# Patient Record
Sex: FEMALE | Race: WHITE | NOT HISPANIC OR LATINO
[De-identification: names, ages, dates, MRNs, and addresses within clinical notes are randomized per-mention and may not be internally consistent; named-entity substitution may affect disease eponyms.]

---

## 2020-10-23 ENCOUNTER — APPOINTMENT (OUTPATIENT)
Dept: OBGYN | Facility: CLINIC | Age: 45
End: 2020-10-23

## 2022-08-29 ENCOUNTER — NON-APPOINTMENT (OUTPATIENT)
Age: 47
End: 2022-08-29

## 2022-08-29 ENCOUNTER — APPOINTMENT (OUTPATIENT)
Dept: OBGYN | Facility: CLINIC | Age: 47
End: 2022-08-29

## 2022-08-29 VITALS
WEIGHT: 200 LBS | HEIGHT: 62 IN | BODY MASS INDEX: 36.8 KG/M2 | SYSTOLIC BLOOD PRESSURE: 140 MMHG | DIASTOLIC BLOOD PRESSURE: 90 MMHG

## 2022-08-29 DIAGNOSIS — N63.10 UNSPECIFIED LUMP IN THE RIGHT BREAST, UNSPECIFIED QUADRANT: ICD-10-CM

## 2022-08-29 DIAGNOSIS — Z01.419 ENCOUNTER FOR GYNECOLOGICAL EXAMINATION (GENERAL) (ROUTINE) W/OUT ABNORMAL FINDINGS: ICD-10-CM

## 2022-08-29 PROCEDURE — 99386 PREV VISIT NEW AGE 40-64: CPT

## 2022-08-29 NOTE — PHYSICAL EXAM
[Chaperone Declined] : Patient declined chaperone [Appropriately responsive] : appropriately responsive [Alert] : alert [No Acute Distress] : no acute distress [No Lymphadenopathy] : no lymphadenopathy [Soft] : soft [Non-tender] : non-tender [Non-distended] : non-distended [No HSM] : No HSM [No Lesions] : no lesions [No Mass] : no mass [Oriented x3] : oriented x3 [Examination Of The Breasts] : a normal appearance [Labia Majora] : normal [Labia Minora] : normal [Normal] : normal [Uterine Adnexae] : normal [___cm] : a ~M [unfilled] ~Ucm superior medial quadrant mass was palpated [Breast Mass Left Breast ___cm] : no mass was palpable

## 2022-08-29 NOTE — HISTORY OF PRESENT ILLNESS
[FreeTextEntry1] : 46 yo,G0, previous pt here, annual exam due. She has a h/o Bartholin glad abscess. H/o IDDM. She has not had a pap in many years or mammo. She offers no gyn complaints. PEriods are regualr, occ. will skip but rare. Periods are not heavy.\par \par She is rarely sexually active.\par \par She denies family h/o gyn or colon cancers.

## 2022-08-29 NOTE — PLAN
[FreeTextEntry1] : Well woman exam\par pap done\par mammo ordered\par rt in 1 year\par possible right breast lump-dx mammo and sono ordered\par

## 2022-09-06 LAB — CYTOLOGY CVX/VAG DOC THIN PREP: NORMAL

## 2023-10-17 ENCOUNTER — APPOINTMENT (OUTPATIENT)
Dept: OBGYN | Facility: CLINIC | Age: 48
End: 2023-10-17

## 2024-01-29 ENCOUNTER — APPOINTMENT (OUTPATIENT)
Dept: ORTHOPEDIC SURGERY | Facility: CLINIC | Age: 49
End: 2024-01-29
Payer: MEDICARE

## 2024-01-29 PROCEDURE — 25600 CLTX DST RDL FX/EPHYS SEP WO: CPT | Mod: LT

## 2024-01-29 PROCEDURE — 99204 OFFICE O/P NEW MOD 45 MIN: CPT | Mod: 57

## 2024-01-29 PROCEDURE — 73130 X-RAY EXAM OF HAND: CPT | Mod: LT

## 2024-01-29 NOTE — ASSESSMENT
[FreeTextEntry1] : The condition was explained to the patient. Fracture alignment within acceptable limits. Plan to proceed with non-surgical treatment.  Risks include, but are not limited to persistent pain, stiffness, post-traumatic arthritis, fracture displacement, need for future surgery, mal-union, non-union. Risk of acute carpal tunnel syndrome, which may require surgery. Risk of delayed tendon rupture, which may require surgery. All patient questions were answered. Patient expressed understanding and would like to proceed with non-surgical treatment.  - prescribed OT custom volar wrist splint, full time except hygiene. applied orthoglass (pre-padded fiberglass) short arm splint in the interim. reviewed splint care instructions. demonstrated HEP for digital flexion/extension. emphasized the importance of performing ROM exercises to reduce stiffness, as finger stiffness can cause grave dysfunction and is difficult to overcome once it has set in. - elevate wrist above level of heart as much as possible to reduce swelling. - NWB to L hand. - recommend ice and OTC pain medications such as Tylenol and NSAIDs as needed. reviewed contra-indicated medical conditions (eg liver disease, kidney disease, or GI ulcer/bleeding) or medications (eg blood thinners). Discussed possible GI and blood pressure side effects. - recommend f/u with PCP regarding osteoporosis evaluation and treatment. will start OTC Calcium + vitamin D supplementation in the interim.  F/u 1wk. X-rays L wrist out of custom splint.

## 2024-01-29 NOTE — HISTORY OF PRESENT ILLNESS
[7] : 7 [de-identified] : 1/29/24: 49yo RHD female ( for 4yo child) presents with uncle for LEFT wrist pain after FOOSH on 1/24/24. She was kneeling on the ground and braced her hand against a shelf to get up, but the shelf split, and she fell onto that hand. Also reports intermittent numbness of hand. Went to [facility] on 1/25/24 => XR, splint.  Hx: DM c/b retinopathy (legally blind), nephropathy, neuropathy - on insulin pump. [] : no [FreeTextEntry1] : left wrist  [FreeTextEntry5] : GUILLERMO is a [ RHD ] F here today for left wrist pain. after falling on hand 01/24/24. swelling in fingertip. bruised up the forearm. went to [facility] 01/25/24, stated she had a fracture. Was put into a splint.  [de-identified] : 01/25/24 [de-identified] : [facility]  [de-identified] : xray

## 2024-01-29 NOTE — IMAGING
[de-identified] : LEFT HAND skin intact. mild swelling of wrist. bruising over volar wrist. TTP to distal radius. TTP to IF/MF. non-tender to elbow. good EPL, FPL. good finger extension, flex to full fist. good finger abduction and adduction.  SILT to median, ulnar, radial distribution.  palpable radial pulse, brisk cap refill all digits. no triggering.   XRAYS OF LEFT WRIST @[facility] 1/25/24: minimally displaced distal radius extra-articular fx. chondrocalcinosis of ulnocarpal and lunotriquetral joints. vascular calcifications. XRYS OF LEFT HAND TODAY: stable position/alignment of minimally displaced distal radius extra-articular fx. chondrocalcinosis of ulnocarpal and lunotriquetral joints. vascular calcifications.

## 2024-02-08 ENCOUNTER — APPOINTMENT (OUTPATIENT)
Dept: ORTHOPEDIC SURGERY | Facility: CLINIC | Age: 49
End: 2024-02-08
Payer: MEDICARE

## 2024-02-08 PROCEDURE — 99024 POSTOP FOLLOW-UP VISIT: CPT

## 2024-02-08 PROCEDURE — 73110 X-RAY EXAM OF WRIST: CPT | Mod: LT

## 2024-02-08 NOTE — IMAGING
[de-identified] : LEFT HAND skin intact. mild swelling of wrist. good EPL, FPL. good finger extension, IF flex to mid-palm, other fingers flex to loose fist. good finger abduction and adduction.  SILT to median, ulnar, radial distribution.  palpable radial pulse, brisk cap refill all digits. no triggering.   XRAYS OF LEFT WRIST: stable position/alignment with interval healing of distal radius extra-articular fx. chondrocalcinosis of ulnocarpal and lunotriquetral joints. vascular calcifications.

## 2024-02-08 NOTE — ASSESSMENT
[FreeTextEntry1] : - maintain OT custom volar wrist splint, full time except hygiene. continue HEP for digital ROM. - elevate wrist above level of heart as much as possible to reduce swelling. - NWB to L hand.  F/u 2wks. X-rays L wrist out of custom splint.

## 2024-02-08 NOTE — HISTORY OF PRESENT ILLNESS
[de-identified] : 2/8/24: 2 weeks s/p LEFT distal radius extra-articular fx from 1/24/24. in OT custom wrist spint, full time except hygiene. pain improving. denies numbness/tingling.  1/29/24: 47yo RHD female ( for 4yo child) presents with uncle for LEFT wrist pain after FOOSH on 1/24/24. She was kneeling on the ground and braced her hand against a shelf to get up, but the shelf split, and she fell onto that hand. Also reports intermittent numbness of hand. Went to [facility] on 1/25/24 => XR, splint.  Hx: DM c/b retinopathy (legally blind), nephropathy, neuropathy - on insulin pump. [FreeTextEntry1] : LEFT wrist  [FreeTextEntry5] : GUILLERMO is here today to follow up on her LEFT wrist fx. pt states since last visit, pain and swelling decreased. pt did not start vitamin D and calcium supplememts.

## 2024-02-23 ENCOUNTER — APPOINTMENT (OUTPATIENT)
Dept: ORTHOPEDIC SURGERY | Facility: CLINIC | Age: 49
End: 2024-02-23
Payer: MEDICARE

## 2024-02-23 PROCEDURE — 99024 POSTOP FOLLOW-UP VISIT: CPT

## 2024-02-23 PROCEDURE — 73110 X-RAY EXAM OF WRIST: CPT | Mod: LT

## 2024-02-23 NOTE — IMAGING
[de-identified] : LEFT HAND skin intact. mild swelling of wrist. good EPL, FPL. good finger extension, IF flex to mid-palm, other fingers flex to loose fist. good finger abduction and adduction.  SILT to median, ulnar, radial distribution.  palpable radial pulse, brisk cap refill all digits. no triggering.   XRAYS OF LEFT WRIST: stable position/alignment with interval healing of distal radius extra-articular fx. chondrocalcinosis of ulnocarpal and lunotriquetral joints. vascular calcifications.

## 2024-02-23 NOTE — HISTORY OF PRESENT ILLNESS
[de-identified] : 2/23/24: 4 weeks s/p LEFT distal radius extra-articular fx from 1/24/24. in OT custom wrist spint, full time except hygiene. pain better.  2/8/24: 2 weeks s/p LEFT distal radius extra-articular fx from 1/24/24. in OT custom wrist spint, full time except hygiene. pain improving. denies numbness/tingling.  1/29/24: 47yo RHD female ( for 4yo child) presents with uncle for LEFT wrist pain after FOOSH on 1/24/24. She was kneeling on the ground and braced her hand against a shelf to get up, but the shelf split, and she fell onto that hand. Also reports intermittent numbness of hand. Went to [facility] on 1/25/24 => XR, splint.  Hx: DM c/b retinopathy (legally blind), nephropathy, neuropathy - on insulin pump. [FreeTextEntry1] : LEFT wrist  [FreeTextEntry5] : GUILLERMO is here today to follow up on her LEFT wrist fx. pt states since last visit pain has decreased.

## 2024-03-11 ENCOUNTER — APPOINTMENT (OUTPATIENT)
Dept: ORTHOPEDIC SURGERY | Facility: CLINIC | Age: 49
End: 2024-03-11
Payer: MEDICARE

## 2024-03-11 PROCEDURE — 73110 X-RAY EXAM OF WRIST: CPT | Mod: LT

## 2024-03-11 PROCEDURE — 99024 POSTOP FOLLOW-UP VISIT: CPT

## 2024-03-11 NOTE — ASSESSMENT
[FreeTextEntry1] : - wear OT custom volar wrist splint for at-risk activity and sleep x 2 weeks, then d/c daytime use but ok to continue for sleeping. - prescribed OT for L wrist. WBAT. - gradually advance activity as pain allows.  F/u 6wks. X-rays L wrist.

## 2024-03-11 NOTE — HISTORY OF PRESENT ILLNESS
[de-identified] : 3/11/24: 6.5 weeks s/p LEFT distal radius extra-articular fx from 1/24/24. in OT custom wrist splint, full time except hygiene.  2/23/24: 4 weeks s/p LEFT distal radius extra-articular fx from 1/24/24. in OT custom wrist splint, full time except hygiene. pain better.  2/8/24: 2 weeks s/p LEFT distal radius extra-articular fx from 1/24/24. in OT custom wrist spint, full time except hygiene. pain improving. denies numbness/tingling.  1/29/24: 49yo RHD female ( for 6yo child) presents with uncle for LEFT wrist pain after FOOSH on 1/24/24. She was kneeling on the ground and braced her hand against a shelf to get up, but the shelf split, and she fell onto that hand. Also reports intermittent numbness of hand. Went to [facility] on 1/25/24 => XR, splint.  Hx: DM c/b retinopathy (legally blind), nephropathy, neuropathy - on insulin pump. [FreeTextEntry1] : LEFT wrist  [FreeTextEntry5] : GUILLERMO is here today to follow up on her LEFT wrist fx. pt states since last visit, pain decreased.

## 2024-03-11 NOTE — IMAGING
[de-identified] : LEFT HAND skin intact. mild swelling of wrist. TTP to distal radius. wrist ROM: very limited extension, flexion. good EPL, FPL. good finger extension, fingers flex to loose fist. good finger abduction and adduction.  SILT to median, ulnar, radial distribution.  palpable radial pulse, brisk cap refill all digits. no triggering.   XRAYS OF LEFT WRIST: stable position/alignment with interval healing of distal radius extra-articular fx. chondrocalcinosis of ulnocarpal and lunotriquetral joints. vascular calcifications.

## 2024-05-01 ENCOUNTER — APPOINTMENT (OUTPATIENT)
Dept: ORTHOPEDIC SURGERY | Facility: CLINIC | Age: 49
End: 2024-05-01
Payer: MEDICARE

## 2024-05-01 PROCEDURE — 99213 OFFICE O/P EST LOW 20 MIN: CPT

## 2024-05-01 PROCEDURE — 73110 X-RAY EXAM OF WRIST: CPT | Mod: LT

## 2024-05-01 NOTE — ASSESSMENT
[FreeTextEntry1] : - d/c custom wrist splint. - continue OT for L wrist. WBAT. - gradually advance activity as pain allows.  F/u 6wks.

## 2024-05-01 NOTE — IMAGING
[de-identified] : LEFT HAND skin intact. mild swelling of wrist. TTP to distal radius. wrist ROM: limited extension, flexion. good EPL, FPL. good finger extension, fingers flex to full fist. good finger abduction and adduction.  SILT to median, ulnar, radial distribution.  palpable radial pulse, brisk cap refill all digits. no triggering.   XRAYS OF LEFT WRIST: stable position/alignment with interval healing of distal radius extra-articular fx. chondrocalcinosis of ulnocarpal and lunotriquetral joints. vascular calcifications.

## 2024-05-01 NOTE — HISTORY OF PRESENT ILLNESS
[de-identified] : 5/1/24: 3 months s/p LEFT distal radius extra-articular fx from 1/24/24. in custom wrist splint. Attended OT evaluation on 4/30/24. OT delayed due to the flu.  3/11/24: 6.5 weeks s/p LEFT distal radius extra-articular fx from 1/24/24. in OT custom wrist splint, full time except hygiene.  2/23/24: 4 weeks s/p LEFT distal radius extra-articular fx from 1/24/24. in OT custom wrist splint, full time except hygiene. pain better.  2/8/24: 2 weeks s/p LEFT distal radius extra-articular fx from 1/24/24. in OT custom wrist spint, full time except hygiene. pain improving. denies numbness/tingling.  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 She was kneeling on the ground and braced her hand against a shelf to get up, but the shelf split, and she fell onto that hand. Also reports intermittent numbness of hand. Went to [facility] on 1/25/24 => XR, splint.  Hx: DM c/b retinopathy (legally blind), nephropathy, neuropathy - on insulin pump. [FreeTextEntry1] : LEFT wrist  [FreeTextEntry5] : GUILLERMO is here today to follow up on her LEFT wrist fx. pt states since last visit, pain decreased. wearing custom brace. started therapy yesterday (04/30/24) [de-identified] : therapy

## 2024-06-12 ENCOUNTER — APPOINTMENT (OUTPATIENT)
Dept: ORTHOPEDIC SURGERY | Facility: CLINIC | Age: 49
End: 2024-06-12
Payer: MEDICARE

## 2024-06-12 DIAGNOSIS — S52.552A OTHER EXTRAARTICULAR FRACTURE OF LOWER END OF LEFT RADIUS, INITIAL ENCOUNTER FOR CLOSED FRACTURE: ICD-10-CM

## 2024-06-12 DIAGNOSIS — S69.82XA OTHER SPECIFIED INJURIES OF LEFT WRIST, HAND AND FINGER(S), INITIAL ENCOUNTER: ICD-10-CM

## 2024-06-12 PROCEDURE — 99213 OFFICE O/P EST LOW 20 MIN: CPT

## 2024-06-12 NOTE — IMAGING
[de-identified] : LEFT HAND skin intact. mild swelling of wrist. non-TTP to distal radius. wrist ROM: good extension, limited flexion. mild limited pronation, supination. good EPL, FPL. good finger extension, fingers flex to full fist. good finger abduction and adduction.  SILT to median, ulnar, radial distribution.  palpable radial pulse, brisk cap refill all digits. no triggering.  DRUJ shear => + pain @pronation/supination. no instability.

## 2024-06-12 NOTE — ASSESSMENT
[FreeTextEntry1] : The condition was explained to the patient. Ulnar wrist pain likely due to TFCC injury from initial injury. Prior x-rays also showed chondrocalcinosis of ulnocarpal joint. Patient reports pain is tolerable. Anticipate that this will continue to improve as the year progresses. - recommend wrist widget PRN daytime activity. - renewed OT for L wrist. WBAT. - activity as tolerated.  F/u 2 months. X-rays L wrist.

## 2024-06-12 NOTE — HISTORY OF PRESENT ILLNESS
[de-identified] : 6/12/24: 4.5 months s/p LEFT distal radius extra-articular fx from 1/24/24. pain better, reports intermittent ulnar wrist pain. OT 1x/wk.  5/1/24: 3 months s/p LEFT distal radius extra-articular fx from 1/24/24. in custom wrist splint. Attended OT evaluation on 4/30/24. OT delayed due to the flu.  3/11/24: 6.5 weeks s/p LEFT distal radius extra-articular fx from 1/24/24. in OT custom wrist splint, full time except hygiene.  2/23/24: 4 weeks s/p LEFT distal radius extra-articular fx from 1/24/24. in OT custom wrist splint, full time except hygiene. pain better.  2/8/24: 2 weeks s/p LEFT distal radius extra-articular fx from 1/24/24. in OT custom wrist spint, full time except hygiene. pain improving. denies numbness/tingling.  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 She was kneeling on the ground and braced her hand against a shelf to get up, but the shelf split, and she fell onto that hand. Also reports intermittent numbness of hand. Went to [facility] on 1/25/24 => XR, splint.  Hx: DM c/b retinopathy (legally blind), nephropathy, neuropathy - on insulin pump. [FreeTextEntry1] : LEFT wrist  [FreeTextEntry5] : GUILLERMO is here today to follow up on her LEFT wrist fx. pt states since last visit, pain decreased. attending therapy 1x/week.  [de-identified] : therapy

## 2024-08-12 ENCOUNTER — APPOINTMENT (OUTPATIENT)
Dept: ORTHOPEDIC SURGERY | Facility: CLINIC | Age: 49
End: 2024-08-12
Payer: MEDICARE

## 2024-08-12 DIAGNOSIS — S52.552A OTHER EXTRAARTICULAR FRACTURE OF LOWER END OF LEFT RADIUS, INITIAL ENCOUNTER FOR CLOSED FRACTURE: ICD-10-CM

## 2024-08-12 DIAGNOSIS — S69.82XA OTHER SPECIFIED INJURIES OF LEFT WRIST, HAND AND FINGER(S), INITIAL ENCOUNTER: ICD-10-CM

## 2024-08-12 PROCEDURE — 99212 OFFICE O/P EST SF 10 MIN: CPT

## 2024-08-12 PROCEDURE — 73110 X-RAY EXAM OF WRIST: CPT | Mod: LT

## 2024-08-12 NOTE — IMAGING
[de-identified] : LEFT HAND skin intact. mild swelling of wrist. TTP to fovea. min TTP to distal radius. wrist ROM: good extension, limited flexion. good pronation, mild limited supination. good EPL, FPL. good finger extension, fingers flex to full fist. good finger abduction and adduction.  SILT to median, ulnar, radial distribution.  palpable radial pulse, brisk cap refill all digits. no triggering.  DRUJ shear => + pain supination > pronation. no instability.   XRAYS OF LEFT WRIST (3 views - PA, OBLIQUE, AND LATERAL VIEWS): healed distal radius fx. chondrocalcinosis of ulnocarpal joint. + vascular calcifications. cystic changes in capitate and scaphoid.

## 2024-08-12 NOTE — ASSESSMENT
[FreeTextEntry1] : - wrist widget PRN daytime activity. - continue HEP 2-3x/day until 1 year post-injury. - activity as tolerated.  F/u PRN.

## 2024-08-12 NOTE — HISTORY OF PRESENT ILLNESS
[de-identified] : 8/12/24: 6.5 months s/p LEFT distal radius extra-articular fx, TFCC injury from 1/24/24. + wrist widget PRN painful activity. c/o ulnar wrist pain with opening jars/bottles and repetitive lifting. overall doing well, pain better. Completed OT 2 weeks ago, was attended OT 1x/wk prior to this.  6/12/24: 4.5 months s/p LEFT distal radius extra-articular fx from 1/24/24. pain better, reports intermittent ulnar wrist pain. OT 1x/wk.  5/1/24: 3 months s/p LEFT distal radius extra-articular fx from 1/24/24. in custom wrist splint. Attended OT evaluation on 4/30/24. OT delayed due to the flu.  3/11/24: 6.5 weeks s/p LEFT distal radius extra-articular fx from 1/24/24. in OT custom wrist splint, full time except hygiene.  2/23/24: 4 weeks s/p LEFT distal radius extra-articular fx from 1/24/24. in OT custom wrist splint, full time except hygiene. pain better.  2/8/24: 2 weeks s/p LEFT distal radius extra-articular fx from 1/24/24. in OT custom wrist spint, full time except hygiene. pain improving. denies numbness/tingling.  1/29/24: 49yo RHD female ( for 6yo child) presents with uncle for LEFT wrist pain after FOOSH on 1/24/24. She was kneeling on the ground and braced her hand against a shelf to get up, but the shelf split, and she fell onto that hand. Also reports intermittent numbness of hand. Went to [facility] on 1/25/24 => XR, splint.  Hx: DM c/b retinopathy (legally blind), nephropathy, neuropathy - on insulin pump. [FreeTextEntry1] : LEFT wrist  [FreeTextEntry5] : GUILLERMO is here today to follow up on her LEFT wrist fx. pt states since last visit, pain is about the same. attending therapy 1x/week.  [de-identified] : therapy

## 2024-10-28 ENCOUNTER — APPOINTMENT (OUTPATIENT)
Age: 49
End: 2024-10-28

## 2024-12-02 ENCOUNTER — APPOINTMENT (OUTPATIENT)
Age: 49
End: 2024-12-02
Payer: MEDICARE

## 2024-12-02 VITALS
DIASTOLIC BLOOD PRESSURE: 79 MMHG | HEIGHT: 62 IN | SYSTOLIC BLOOD PRESSURE: 147 MMHG | WEIGHT: 189 LBS | HEART RATE: 86 BPM | BODY MASS INDEX: 34.78 KG/M2

## 2024-12-02 DIAGNOSIS — N93.9 ABNORMAL UTERINE AND VAGINAL BLEEDING, UNSPECIFIED: ICD-10-CM

## 2024-12-02 DIAGNOSIS — Z12.39 ENCOUNTER FOR OTHER SCREENING FOR MALIGNANT NEOPLASM OF BREAST: ICD-10-CM

## 2024-12-02 DIAGNOSIS — Z01.419 ENCOUNTER FOR GYNECOLOGICAL EXAMINATION (GENERAL) (ROUTINE) W/OUT ABNORMAL FINDINGS: ICD-10-CM

## 2024-12-02 DIAGNOSIS — N90.89 OTHER SPECIFIED NONINFLAMMATORY DISORDERS OF VULVA AND PERINEUM: ICD-10-CM

## 2024-12-02 PROCEDURE — G0101: CPT

## 2024-12-02 PROCEDURE — 99203 OFFICE O/P NEW LOW 30 MIN: CPT | Mod: 25

## 2024-12-04 LAB — HPV HIGH+LOW RISK DNA PNL CVX: NOT DETECTED

## 2025-01-09 ENCOUNTER — APPOINTMENT (OUTPATIENT)
Age: 50
End: 2025-01-09
Payer: MEDICARE

## 2025-01-09 ENCOUNTER — APPOINTMENT (OUTPATIENT)
Dept: ANTEPARTUM | Facility: CLINIC | Age: 50
End: 2025-01-09
Payer: MEDICARE

## 2025-01-09 ENCOUNTER — ASOB RESULT (OUTPATIENT)
Age: 50
End: 2025-01-09

## 2025-01-09 VITALS
WEIGHT: 185 LBS | HEIGHT: 62 IN | BODY MASS INDEX: 34.04 KG/M2 | SYSTOLIC BLOOD PRESSURE: 141 MMHG | DIASTOLIC BLOOD PRESSURE: 81 MMHG | HEART RATE: 89 BPM

## 2025-01-09 DIAGNOSIS — N90.89 OTHER SPECIFIED NONINFLAMMATORY DISORDERS OF VULVA AND PERINEUM: ICD-10-CM

## 2025-01-09 DIAGNOSIS — N93.9 ABNORMAL UTERINE AND VAGINAL BLEEDING, UNSPECIFIED: ICD-10-CM

## 2025-01-09 PROCEDURE — 99214 OFFICE O/P EST MOD 30 MIN: CPT

## 2025-01-09 PROCEDURE — 99459 PELVIC EXAMINATION: CPT

## 2025-01-09 PROCEDURE — 76830 TRANSVAGINAL US NON-OB: CPT

## 2025-01-09 PROCEDURE — 76856 US EXAM PELVIC COMPLETE: CPT | Mod: 59
